# Patient Record
Sex: MALE | Race: WHITE | URBAN - METROPOLITAN AREA
[De-identification: names, ages, dates, MRNs, and addresses within clinical notes are randomized per-mention and may not be internally consistent; named-entity substitution may affect disease eponyms.]

---

## 2023-01-12 ENCOUNTER — OFFICE VISIT (OUTPATIENT)
Dept: ORTHOPEDIC SURGERY | Age: 28
End: 2023-01-12

## 2023-01-12 VITALS — WEIGHT: 180 LBS | BODY MASS INDEX: 22.38 KG/M2 | HEIGHT: 75 IN

## 2023-01-12 DIAGNOSIS — I51.7 LEFT VENTRICULAR DILATATION: Primary | ICD-10-CM

## 2023-01-12 DIAGNOSIS — S83.411A SPRAIN OF MEDIAL COLLATERAL LIGAMENT OF RIGHT KNEE, INITIAL ENCOUNTER: Primary | ICD-10-CM

## 2023-01-12 DIAGNOSIS — M25.561 RIGHT KNEE PAIN, UNSPECIFIED CHRONICITY: ICD-10-CM

## 2023-01-12 DIAGNOSIS — I34.1 MITRAL VALVE PROLAPSE: ICD-10-CM

## 2023-01-12 DIAGNOSIS — I77.810 AORTIC ROOT DILATATION (HCC): ICD-10-CM

## 2023-01-12 NOTE — PROGRESS NOTES
Name: Franki Rangel  YOB: 1995  Gender: male  MRN: [id number]      CC: Knee Injury (R Knee )       HPI: Franki Rangel is a 27 y.o. male who presents with Knee Injury (R Knee )  Pt reports he sustained an injury to his R MCL back in November. This is the first injury to this knee. Reports this happened while he was playing hockey. Reports he has been fine since then, no pain, and has been practicing full go. Denies any instability, locking, or clicking. Has not reported any episodes of swelling, however states his knee will feel stiff after sitting for extended periods of time. Reports he has been going to formal therapy for this injury, has not taken any over the counters or prescribed medication.     ROS/Meds/PSH/PMH/FH/SH: I personally reviewed the patients standard intake form.  Below are the pertinents    Tobacco:  reports that he has never smoked. He has never used smokeless tobacco.  Diabetes: none  Other: None    Physical Examination:  General: no acute distress  Lungs: breathing easily  CV: regular rhythm by pulse  Right Knee: Negative effusion present.  No tenderness to palpation over the medial joint line, some tenderness to palpation over the biceps femoris.  Full active and passive range of motion with no pain in the extremes.  Stable to  varus stress at 0 and 30 degrees no appreciable laxity.  He does have some mild grade 1 laxity to the valgus stress testing at 30 degrees but a solid endpoint and no pain associated with that.  No laxity in extension.  Negative Lachman's, anterior drawer.  Negative Karishma's, negative bounce home test.    Left knee exam is benign.      Imaging:   Knee XR: 3 views     Clinical Indication  1. Sprain of medial collateral ligament of right knee, initial encounter    2. Right knee pain, unspecified chronicity           Report: AP, lateral, sunrise views of the Right knee demonstrates no acute fracture dislocation, or advanced degenerative  changes    Impression: No acute findings as above           All imaging interpreted by myself Nathan Sanon MD independent of radiology review        Assessment:     ICD-10-CM    1. Sprain of medial collateral ligament of right knee, initial encounter  S83.411A       2. Right knee pain, unspecified chronicity  M25.561 XR KNEE RIGHT (3 VIEWS)          Plan: Think the patient is recovering well from a previous MCL sprain. He has no joint line pain and some very mild grade 1 laxity on valgus without pain. He does have some tightness that presents in the lateral knee and the biceps femoris I think may just be some tendinitis. Think is reasonable for him to work with the athletic training staff to manage his tightness in his hamstring. I think it is reasonable for him to return to live healthy and he can return to see me if he has return of pain with live action hockey. Nolan Leslie NP dictating as a scribe for MD Reyna Lopez.  Bess Sanon MD, [address] [place] [address] and Sports Medicine

## 2023-01-13 ENCOUNTER — INITIAL CONSULT (OUTPATIENT)
Dept: CARDIOLOGY CLINIC | Age: 28
End: 2023-01-13

## 2023-01-13 VITALS
DIASTOLIC BLOOD PRESSURE: 80 MMHG | WEIGHT: 181.5 LBS | HEIGHT: 75 IN | SYSTOLIC BLOOD PRESSURE: 122 MMHG | BODY MASS INDEX: 22.57 KG/M2 | HEART RATE: 61 BPM

## 2023-01-13 DIAGNOSIS — I34.1 MITRAL VALVE PROLAPSE: ICD-10-CM

## 2023-01-13 DIAGNOSIS — I05.9 MITRAL VALVE DISORDER: Primary | ICD-10-CM

## 2023-01-13 DIAGNOSIS — I34.0 NONRHEUMATIC MITRAL VALVE REGURGITATION: ICD-10-CM

## 2023-01-13 DIAGNOSIS — I77.810 DILATED AORTIC ROOT (HCC): ICD-10-CM

## 2023-01-13 ASSESSMENT — ENCOUNTER SYMPTOMS
PHOTOPHOBIA: 0
CONSTIPATION: 0
VOMITING: 0
COUGH: 0
SHORTNESS OF BREATH: 0
DIARRHEA: 0
NAUSEA: 0
WHEEZING: 0
ABDOMINAL DISTENTION: 0
ABDOMINAL PAIN: 0

## 2023-01-13 NOTE — PROGRESS NOTES
[facility] CARDIOLOGY  [address], [address]  PHONE: [phone]        NAME:  Marietta Ramirez  : 1995  MRN: [id number]     PCP:  Rob Martinez MD    SUBJECTIVE:   Marietta Ramirez is a 33 y.o. male seen for a consultation visit regarding the following:     Chief Complaint   Patient presents with    Consultation     Dilated aorta        HPI:    He presents to establish new cardiac care. 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 He needs clearance to play. He is clinically completely asymptomatic at rest and with exercise ain addition to playing hockey. He specifically denies any angina, syncope, presyncope, shortness of breath, dyspnea on exertion, or palpitations whatsoever. His echo indeed shows mitral valve prolapse but with only mild mitral regurgitation, trace AI with an aortic root that is minimally dilated at 4.1 cm. He does not have any significant left ventricular hypertrophy on echo, although his ECG is suggestive of borderline criteria. His exam is clinically completely asymptomatic and I hear no murmurs. Past Medical History, Past Surgical History, Family history, Social History, and Medications were all reviewed with the patient today and updated as necessary. No current outpatient medications on file. No current facility-administered medications for this visit. No Known Allergies    Patient Active Problem List    Diagnosis Date Noted    Mitral valve prolapse 2023     Priority: High    Nonrheumatic mitral valve regurgitation 2023     Priority: High    Dilated aortic root ([address] 2023     Priority: High         History reviewed. No pertinent surgical history. History reviewed. No pertinent family history.   No premature family CAD or SCD    Social History Tobacco Use    Smoking status: Never    Smokeless tobacco: Never   Substance Use Topics    Alcohol use: Social at most       ROS:    Review of Systems   Constitutional:  Negative for chills, fatigue, fever and unexpected weight change. HENT:  Negative for congestion, nosebleeds and tinnitus. Eyes:  Negative for photophobia and visual disturbance. Respiratory:  Negative for cough, shortness of breath and wheezing. Cardiovascular:  Negative for chest pain, palpitations and leg swelling. Gastrointestinal:  Negative for abdominal distention, abdominal pain, constipation, diarrhea, nausea and vomiting. Endocrine: Negative for cold intolerance and heat intolerance. Genitourinary:  Negative for dysuria, frequency and hematuria. Musculoskeletal:  Negative for arthralgias, joint swelling and myalgias. Skin:  Negative for rash and wound. Allergic/Immunologic: Negative for environmental allergies. Neurological:  Negative for dizziness, seizures, syncope and light-headedness. Hematological:  Negative for adenopathy. Does not bruise/bleed easily. Psychiatric/Behavioral:  Negative for agitation, behavioral problems, confusion and hallucinations. PHYSICAL EXAM:  Vitals:    01/13/23 1337   BP: 122/80   Pulse: 61   Weight: 181 lb 8 oz (82.3 kg)   Height: 6' 3\" (1.905 m)      Wt Readings from Last 3 Encounters:   01/13/23 181 lb 8 oz (82.3 kg)   01/13/23 180 lb (81.6 kg)   01/12/23 180 lb (81.6 kg)      BP Readings from Last 3 Encounters:   01/13/23 122/80   01/13/23 114/76        Physical Exam  Constitutional:       Appearance: Normal appearance. HENT:      Head: Normocephalic and atraumatic. Nose: Nose normal.      Mouth/Throat:      Mouth: Mucous membranes are moist.      Pharynx: Oropharynx is clear. Eyes:      Extraocular Movements: Extraocular movements intact. Pupils: Pupils are equal, round, and reactive to light. Neck:      Vascular: No carotid bruit or JVD. Cardiovascular:      Rate and Rhythm: Normal rate and regular rhythm. Heart sounds: No murmur heard. No friction rub. No gallop. Pulmonary:      Effort: Pulmonary effort is normal.      Breath sounds: Normal breath sounds. No wheezing or rhonchi. Abdominal:      General: Abdomen is flat. Bowel sounds are normal. There is no distension. Palpations: Abdomen is soft. Tenderness: There is no abdominal tenderness. Musculoskeletal:         General: Normal range of motion. Cervical back: Normal range of motion and neck supple. No tenderness. Skin:     General: Skin is warm and dry. Neurological:      General: No focal deficit present. Mental Status: He is alert and oriented to person, place, and time. Psychiatric:         Mood and Affect: Mood normal.         Behavior: Behavior normal.        Medical problems and test results were reviewed with the patient today. No results found for: CHOL  No results found for: TRIG  No results found for: HDL  No results found for: LDLCHOLESTEROL, LDLCALC  No results found for: LABVLDL, VLDL  No results found for: CHOLHDLRATIO     No results found for: NA, K, CL, CO2, BUN, CREATININE, GLUCOSE, CALCIUM      No results found for: WBC, HGB, HCT, MCV, PLT     No results found for: TSHFT4, TSH     No results found for: LABA1C    Results for orders placed or performed in visit on 01/13/23   EKG 12 Lead    Impression    Sinus  Rhythm  61bpm  Rosario = 116  -RSR(V1) -nondiagnostic.    Minimal voltage criteria for LVH  (R(V6) exceeds 2.26 mV)  -Voltage criteria w/o ST/T abnormality may be normal.   Results for orders placed or performed in visit on 01/13/23   Transthoracic echocardiogram (TTE) complete with contrast, bubble, strain, and 3D PRN   Result Value Ref Range    LV EDV A2C 122 mL    LV EDV A4C 158 mL    LV ESV A2C 60 mL    LV ESV A4C 66 mL    IVSd 0.9 0.6 - 1.0 cm    LVIDd 5.2 4.2 - 5.9 cm    LVIDs 3.8 cm    LVOT Peak Velocity 1.0 m/s    LVOT Peak Gradient 4 mmHg    LVPWd 1.0 0.6 - 1.0 cm    LV E' Lateral Velocity 15 cm/s    LV E' Septal Velocity 9 cm/s    LV Ejection Fraction A2C 51 %    LV Ejection Fraction A4C 58 %    EF BP 53 (A) 55 - 100 %    LA Minor Axis 5.2 cm    LA Major [place] 4.9 cm    LA Area 2C 22.6 cm2    LA Area 4C 17.6 cm2    LA Volume 2C 80 (A) 18 - 58 mL    LA Volume 4C 46 18 - 58 mL    LA Volume BP 62 (A) 18 - 58 mL    LA Diameter 3.8 cm    AV Peak Velocity 1.1 m/s    AV Peak Gradient 5 mmHg    Aortic Arch 2.5 cm    Aortic Root 4.1 cm    Ascending Aorta 2.5 cm    Descending Aorta 2.1 cm    MV E Wave Deceleration Time 183.0 ms    MV A Velocity 0.50 m/s    MV E Velocity 0.88 m/s    Est. RA Pressure 3 mmHg    RVIDd 4.3 cm    TR Max Velocity 2.24 m/s    TR Peak Gradient 20 mmHg    Body Surface Area 2.08 m2    Fractional Shortening 2D 27 28 - 44 %    LV ESV Index A4C 31 mL/m2    LV EDV Index A4C 75 mL/m2    LV ESV Index A2C 28 mL/m2    LV EDV Index A2C 58 mL/m2    LVIDd Index 2.46 cm/m2    LVIDs Index 1.80 cm/m2    LV RWT Ratio 0.38     LV Mass 2D 181.4 88 - 224 g    LV Mass 2D Index 86.0 49 - 115 g/m2    MV E/A 1.76     E/E' Ratio (Averaged) 7.82     E/E' Lateral 5.87     E/E' Septal 9.78     LA Volume Index BP 29 16 - 34 ml/m2    LA Volume Index 2C 38 (A) 16 - 34 mL/m2    LA Volume Index 4C 22 16 - 34 mL/m2    LA Size Index 1.80 cm/m2    LA/AO Root Ratio 0.93     Ao Root Index 1.94 cm/m2    Ascending Aorta Index 1.18 cm/m2    AV Velocity Ratio 0.91     RVSP 23 mmHg    Descending Aorta Index 1.00 cm/m2    Narrative      Left Ventricle: Normal left ventricular systolic function with a   visually estimated EF of 55 - 60%. Left ventricle size is normal. Normal   wall thickness. Normal wall motion. Normal diastolic function. Aortic Valve: Mild regurgitation. Mitral Valve: Mildly thickened leaflet, at the anterior and posterior   leaflets with mild bileaflet prolapse. Mild regurgitation. Tricuspid Valve: Mild regurgitation. Normal RVSP. The estimated RVSP is   23 mmHg. Left Atrium: Left atrium is mildly dilated. Aorta: Normal sized ascending aorta. Mildly dilated aortic root. Ao   root diameter is 4.1 cm. ASSESSMENT and PLAN    Katiuska Guerrero was seen today for consultation. Diagnoses and all orders for this visit:    Abnormal ECG-mild/minimal voltage criteria for LVH but echo confirms normal wall thickness and normal wall motion. Recheck echo in 1 year. -     EKG 12 Lead    Mitral valve prolapse-mild bileaflet thickening and prolapse but only mild associated mitral regurgitation, clinically asymptomatic. Recheck Brendan echo in 1 year    Nonrheumatic mitral and aortic valve regurgitation-as above    Dilated aortic root (HCC)-minimally dilated, clinically asymptomatic, may be normal for his body stature. Recheck echo in 1 year. He should be low risk to play hockey for the [employer]. He is to call immediately with any new onset exertional chest discomfort, dyspnea out of ordinary, or palpitations/dizziness. Return in about 1 year (around 1/13/2024).          Edd Latham MD  01/13/23  1:59 PM

## 2023-02-18 ENCOUNTER — HOSPITAL ENCOUNTER (OUTPATIENT)
Dept: MRI IMAGING | Age: 28
End: 2023-02-18
Payer: COMMERCIAL

## 2023-02-18 DIAGNOSIS — M25.561 RIGHT MEDIAL KNEE PAIN: Primary | ICD-10-CM

## 2023-02-18 DIAGNOSIS — M25.561 RIGHT MEDIAL KNEE PAIN: ICD-10-CM

## 2023-02-18 PROCEDURE — 73721 MRI JNT OF LWR EXTRE W/O DYE: CPT

## 2023-02-21 ENCOUNTER — OFFICE VISIT (OUTPATIENT)
Dept: ORTHOPEDIC SURGERY | Age: 28
End: 2023-02-21

## 2023-02-21 DIAGNOSIS — M25.561 RIGHT KNEE PAIN, UNSPECIFIED CHRONICITY: ICD-10-CM

## 2023-02-21 DIAGNOSIS — S89.91XA INJURY OF POSTERIOR CRUCIATE LIGAMENT OF RIGHT KNEE, INITIAL ENCOUNTER: Primary | ICD-10-CM

## 2023-02-21 NOTE — PROGRESS NOTES
Name: Truman Matthews  YOB: 1995  Gender: male  MRN: [id number]      CC: Knee Pain (Right)       HPI: Truman Matthews is an established patient of mine who presents with a new injury to the right knee. He reports colliding and bumping knees with an opposing player last Thursday. He complains of pain diffuse throughout the knee that worsens with flexion and prolonged weightbearing. He returns for follow up and MRI results. He did have an acute onset of swelling and an effusion the night of the game but that has resolved significantly over the last 3 to 4 days. .        Physical Examination:  General: no acute distress  Lungs: breathing easily  CV: regular rhythm by pulse  Right Knee: Trace effusion. Passive extension is symmetric to the contralateral side with some pain at the extremes. Flexion he lacks about 5 degrees compared to the contralateral side. He has negative posterior sag test.  He does have a grade 1 posterior drawer but an endpoint is appreciated. He is some mild pain associated with that. Negative Lachman's. Stable to varus and valgus stresses feels a little bit of discomfort to valgus at 30 degrees which is consistent with his previous MCL injury. Negative meniscal provocative testing. Imaging:   I reviewed the MRI scan of his right knee which demonstrates some bone edema on the medial femoral condyle just deep to the deep MCL and some signal in the MCL which is likely consistent with his previous MCL injury. There does appear to be a partial-thickness PCL tear near the femoral side in the mid substance but the fibers are oriented well ACL is intact menisci intact  All imaging interpreted by myself Nathan Terry MD independent of radiology review    Assessment:     ICD-10-CM    1. Injury of posterior cruciate ligament of right knee, initial encounter  S89. 91XA       2.  Right knee pain, unspecified chronicity  M25.561            Plan:   He has a recent MCL injury which he is recovering from. I think this is a grade 1 PCL injury. He has no significant instability on exam just some grade 1 laxity. His pain and effusion are resolving and improving already. We will plan to treat this nonoperatively with physical therapy and rehab with his . I do think he would benefit from a functional PCL brace which was ordered today. He can progress his activity as tolerated in the brace and I will see him back in about 3 weeks            Nathan Chun MD, [facility] and Sports Medicine

## 2023-02-21 NOTE — LETTER
DME Patient Authorization Form    Name: Henrietta Babinski  : 1995  MRN: [id number]   Age: 33 y.o. Gender: male  Delivery Address: [facility]     Diagnosis:     ICD-10-CM    1. Right knee pain, unspecified chronicity  M25.561       2. Injury of posterior cruciate ligament of right knee, initial encounter  S89. 91XA            Requested DME:  Knee Orthosis Prefab Double Upright Thigh & Calf-  ($1110) X 1 - right  FUNCTIONAL PCL BRACE  6\" above - 20\"  6\" below - 14\"    Clinical Notes:     **Indicates non-covered items by insurance. Payment expected on date of service. Electronically signed by  Provider: Edgar Orellana MD__Date: 2023                            [facility] Tax ID # [id number]        Durable Medical Equipment and/or Orthotics Patient Consent     I understand that my physician has prescribed this medical supply as part of my treatment plan as a matter of Medical Necessity.  I understand that I have a choice in where I receive my prescribed orthopedic supplies and/or services.  I authorize [facility] to furnish this service/product and to provide my insurance carrier with any information requested in order to process for payment.  I instruct my insurance carrier to pay [facility] directly for these services/products.  I understand that my insurance carrier may deny payment for this supply because it is a non-covered item, deemed not medically necessary or considered experimental.   I understand that any cost not covered by my insurance carrier will be solely my financial responsibility.  I have received the Supplier Standards and have reviewed them.    I have received the prescribed item and have been fully instructed on the proper use of the above services/products.    ______ (Patient Initials) I understand that all DME items are non-returnable after being dispensed. Items still in sealed packaging may be returned up to 14 days after purchasing. [facility] will replace items that are defective.    ______ (Patient Initials) I understand that [facility] will not file a claim with my insurance carrier for this service/product and I am waiving my right to file a claim on my own for this service/product with my insurance company as this item is NON-COVERED (Denoted by the **) by my Insurance company/policy.    ______ (Patient Initials) I understand that I am responsible to bring my equipment to the hospital for any surgery.      ______________________________________________  ________________________  Patient / Guarantor Signature          Date      Inventory Stickers            Thank you for considering [facility].  Your physician has prescribed specific medical equipment or devices for your home use.  The following describes your rights and responsibilities as our customer.  Right to Choose Providers:  You have a choice regarding which company supplies your home medical equipment and devices, and to consult your physician in this decision.  You may choose a medical supply store, a home medical equipment provider, or a specialist such as [facility].  [facility] will coordinate with your physician to provide the medical equipment or devices prescribed for your home use.    Right to Service:  You have the right to considerate, respectful and nondiscriminatory care.  You have the right to receive accurate and easily understood information about your health care.  If you speak a foreign language, or don't understand the discussions, assistance will be provided to allow you to make informed health care decisions.  You have the right to know your treatment options and to participate in decisions about your care, including the right to accept or refuse treatment.   You have the right to expect a reasonable response to your requests for treatment or service. You have the right to talk in confidence with health care providers and to have your health care information protected. You have the right to receive an explanation of your bill. You have the right to complain about the service or product you receive. Patient Responsibilities:  Please provide complete and accurate information about your health insurance benefits and make arrangements for the timely payment of your bill. [facility] will, if possible, assume responsibility for billing your insurance (Medicare, Medicaid and commercial) for the prescribed equipment or devices. If your policy does not cover the prescribed product, or only covers a portion of the bill, you are responsible for any remaining balance. Return and Exchange Policy:  [facility] will honor published  Warranties for products. [facility] will accept returns or exchanges within 14 days from the date of receipt, providin) the product must be in new condition; 2) receipt as required; and 3) used disposable and hygiene products may only be returned due to a defective product. Note: Refunds will be issued in a timely manner, please allow 4-6 weeks for processing. Complaint Procedures and DME Consumer Protection Resources:  [facility] values you as a customer, and is committed to resolving patient concerns. This commitment includes understanding and documenting your concerns, conducting a review of internal procedures, and providing you with an explanation and resolution to your concerns. Should you have any questions about our services or billing process, please contact our office at (practice phone number). If we are unable to resolve the concern, you have the right to direct comments to the office of Consumer Protection, in the [address] or the [facility] office, without fear of repercussion.     DMEPOS SUPPLIER STANDARDS    A supplier must be in compliance with all applicable Federal and [facility] and regulatory requirements. A supplier must provide complete and accurate information on the DMEPOS supplier application. Any changes to this information must be reported to the [facility] [employer] within 30 days. An authorized individual (one whose signature is binding) must sign the application for billing privileges. A supplier must fill orders from its own inventory, or must contract with other companies for the purchase of items necessary to fill the order. A supplier may not contract with any entity that is currently excluded from the Medicare program, any [facility] program, or from any other Federal procurement or Nonprocurement programs. A supplier must advise beneficiaries that they may rent or purchase inexpensive or routinely purchased durable medical equipment, and of the purchase option for capped rental equipment. A supplier must notify beneficiaries of warranty coverage and honor all warranties under applicable State Law, and repair or replace free of charge Medicare covered items that are under warranty. A supplier must maintain a physical facility on an appropriate site. A supplier must permit CMS, or its agents to conduct on-site inspections to ascertain the supplier's compliance with these standards. The supplier location must be accessible to beneficiaries during reasonable business hours, and must maintain a visible sign and posted hours of operation. A supplier must maintain a primary business telephone listed under the name of the business in a [employer] or a toll free number available through directory assistance. The exclusive use of a beeper, answering machine or cell phone is prohibited.   A supplier must have comprehensive liability insurance in the amount of at least $300,000 that covers both the supplier's place of business and all customers and employees of the supplier. If the supplier manufactures its own items, this insurance must also cover product liability and completed operations. A supplier must agree not to initiate telephone contact with beneficiaries, with a few exceptions allowed. This standard prohibits suppliers from calling beneficiaries in order to solicit new business. A supplier is responsible for delivery and must instruct beneficiaries on use of Medicare covered items, and maintain proof of delivery. A supplier must answer questions, and respond to complaints of the beneficiaries, and maintain documentation of such contacts. A supplier must maintain and replace at no charge or repair directly, or through a service contract with another company, Medicare covered items it has rented to beneficiaries. A supplier must accept returns of substandard (less than full quality for the particular item) or unsuitable items (inappropriate for the beneficiary at the time it was fitted and rented or sold) from beneficiaries. A supplier must disclose these supplier standards to each beneficiary to whom it supplies a Medicare-covered item. A supplier must disclose to the government any person having ownership, financial, or control interest in the supplier. A supplier must not convey or reassign a supplier number; i.e., the supplier may not sell or allow another entity to use its Medicare billing number. A supplier must have a complaint resolution protocol established to address beneficiary complaints that relate to these standards. A record of these complaints must be maintained at the physical facility. Complaint records must include: the name, address, telephone number and health insurance claim number of the beneficiary, a summary of the complaint, and any action taken to resolve it. A supplier must agree to furnish CMS any information required by the Medicare statute and implementing regulations.   A supplier of DMEPOS and other items and services must be accredited by a CMS-approved accreditation organization in order to receive and retain a supplier billing number. The accreditation must indicate the specific products and services, for which the supplier is accredited in order for the supplier to receive payment for those specific products and services. A DMEPOS supplier must notify their accreditation organization when a new DMEPOS location is opened. The accreditation organization may accredit the new supplier location for three months after it is operational without requiring a new site visit. All DMEPOS supplier locations, whether owned or subcontracted, must meet the [employer] and be separately accredited in order to bill Medicare. An accredited supplier may be denied enrollment or their enrollment may be revoked, if CMS determines that they are not in compliance with the DMEPOS quality standards. A DMEPOS supplier must disclose upon enrollment all products and services, including the addition of new product lines for which they are seeking accreditation. If a new product line is added after enrollment, the DMEPOS supplier will be responsible for notifying the accrediting body of the new product so that the DMEPOS supplier can be re-surveyed and accredited for these new products. Must meet the surety bond requirements specified in 42 C. F.R. 424.57(c). Implementation date- May 4, 2009. A supplier must obtain oxygen from a state-licensed oxygen supplier. A supplier must maintain ordering and referring documentation consistent with provisions found in 42 C. F.R. 424.516(f). DMEPOS suppliers are prohibited from sharing a practice location with certain other Medicare providers and suppliers. DMEPOS suppliers must remain open to the public for a minimum of 30 hours per week with certain exceptions.

## 2023-02-23 NOTE — PROGRESS NOTES
[facility] [address] [address]  Dept: [phone]      Physical Therapy Initial Assessment     Referring MD: Juan Jose Guthrei MD  Diagnosis:     ICD-10-CM    1. Right knee pain, unspecified chronicity  M25.561       2. Muscle weakness  M62.81       3. Tear of PCL (posterior cruciate ligament) of knee, right, sequela  S83.521S          Therapy precautions: limit HS strengthening and kneeling  Co-morbidities affecting plan of care: hx L ankle fx 1 yr ago and recent R MCL injury (12 weeks ago)  Total Time: 60 min, Timed Procedure Codes: 35 min   Visit count:  1    PERTINENT MEDICAL HISTORY     Past medical and surgical history:   No past medical history on file. No past surgical history on file. Medications: reviewed in chart   Allergies: No Known Allergies       SUBJECTIVE     Chief complaints/history of injury:    Date symptoms began: 2/15/23  Jorge Math of condition: Recent onset (initial onset within last 3 months)  Primary cause of current episode: Traumatic  How did symptoms start: Pt reports in November sustaining R MCL tear Gr II and he was out 11 weeks. He then returned to playing in January and re tweaked the [facility] w/ 2 weeks missed. He then returned to playing and on 2/15 and was hit knee on knee and sustained a R PCL tear Gr I.  He reports pain has improved in knee and he has been feeling better already. Of note, he has hx of L ankle ORIF and when being examined reports this injury never felt as though he fully recovered. He played in [facility] until he moved to [place] 1 month ago. 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 No    Pain Assessment:   Average Pain/symptom intensity (0-10 scale)  Last 24 hours: 0/10  Last week (1-7 days): 2-3/10  How often do you feel symptoms?  Occasionally (26-50%)  Description: sharp  Aggravating factors:cutting, skating, dynamic shifting  Alleviating factors: rest,    Neuro screen: denies numbness, tingling, and radiating pain        Patient Stated Goals: full return to hockey    OBJECTIVE EXAMINATION     Functional Outcome Questionnaire:   Lower Extremity Functional Scale: 76/80= 95% function     Observation:   Girth: 6\" prox quad 50 cm; apex of gastroc 35.5  Swelling/Edema: none to trace amount  Palpation: no TTP        TRIAL (RIGHT) Anterior (A)  Posteromedial (PM) Posterolateral (PL)   1 76 cm 135 cm 141 cm   2   80 cm 137 cm 140 cm   3   79 cm 140 cm 142 cm   Greatest Successful Reach        TRIAL (LEFT) Anterior (A)  Posteromedial (PM) Posterolateral (PL)   1 70 cm 135 cm 138 cm   2 62 cm 137 cm 140 cm   3 70 cm 140 cm 139 cm   Greatest Successful Reach                 A/PROM Measures:    Right Left Comment   Hip Flexion      Hip Abduction      Hip IR      Hip ER      Knee Extension -4 hyper -6    Knee Flexion 135 138 Very mild end ROM pain   Hip [facility] WFL    Ankle WFL Limited DF            Strength/MMT (0-5 Scale):   Right Left Comment   Knee Flexion NT NT    Knee Extension 69.8 63    Quad set      Hip Flexion      Hip Extension 91 87    Hip Abduction 49 49 Fatigues L and repeat measures weaker   Hip ADD 40 46.8    Hip IR      Ankle DF      Ankle PF              Pt generally weaker w/ L leg testing and functional assessment w/ Y balance. Special Tests/Function:     Squat: limited depth  Balance: impaired L vs R (SLS)      Treatment provided today consisted of initial evaluation followed by: Therapeutic exercise (52124) x 20 min to address ROM/strength deficits and to develop an initial HEP as noted below. SLS 3x30s airex w/ KB deltoid raise 10#  SL squat w/ KB crossover touch 3x5  Lunge iso  [place]  SL bridge LLE    Manual therapy (84741) x 15 min utilizing techniques to improve joint and/or soft tissue mobility, ROM, and function as well as helping to decrease pain/spasms and swelling.   Palpation and assessment of soft tissue, muscles, and landmarks   Quad medially and HS STM w/ pt prone      Patient Education on the condition/pathology, involved anatomy, and exercise rationale. CLINICAL DECISION MAKING/ASSESSMENT     Personal Factors/co-morbidities affecting POC (1-2 Medium/3+High): co-morbidities as previously noted   Problem List: (1-2 Low/ 3 Medium/ 4+ High) Pain  ROM limitations  Strength deficits  Impaired balance/proprioception  Decreased endurance/activity Tolerance  Limited work/occupational capacity  Restricted recreational participation    Clinical decision making: low complexity with therapist able to easily and confidently determine and predict expectations and future outcomes for the POC. Prognosis: excellent   Benefits and precautions of treatment explained to patient. Truman Matthews is a 33 y.o. male who presents to therapy today with stable clinical presentation (low complexity) related to R knee pain. Pt presents w/ dec strength, impaired mobility and overall dec functional ability. This limits adonis of running, skating, squatting   Pt would benefit from skilled physical therapy services to address the deficits noted above for return to prior level of function. PLAN OF CARE     Effective Dates: 2/24/2023 TO 4/7/2023  (42 days).     Frequency/Duration: 1x/week for 42 Day(s); pt to have f/u w/ [facility]  Interventions may include but are not limited to: (62785) Therapeutic exercise to develop ROM, strength, endurance and flexibility  (13205) Therapeutic activities using dynamic activities to improve function  (71677) Manual therapy techniques to improve joint and/or soft tissue mobility, ROM, and function as well as helping to decrease pain/spasms and swelling  (34608) Neuromuscular reeducation addressing impaired balance, coordination, kinesthetic sense, posture and proprioception  Home exercise program (HEP) development        GOALS     Short term goals to be met by 3/17/2023  (3 weeks):  Pt will demonstrate pain free ROM for donning shoes  Pt will demo 15 SL squat reps to bench to simulate strength/endurance for skating  Pt will be able to skate 30 min session with symptoms less than 3/10    Long term goals to be met by 4/7/2023  (6 weeks):   Pt will pass battery of hop testing for full sport participation  Pt will perform graded practice/contact/full participation in [address]

## 2023-02-24 ENCOUNTER — OFFICE VISIT (OUTPATIENT)
Age: 28
End: 2023-02-24

## 2023-02-24 DIAGNOSIS — S83.521S TEAR OF PCL (POSTERIOR CRUCIATE LIGAMENT) OF KNEE, RIGHT, SEQUELA: ICD-10-CM

## 2023-02-24 DIAGNOSIS — S89.91XA INJURY OF POSTERIOR CRUCIATE LIGAMENT OF RIGHT KNEE, INITIAL ENCOUNTER: ICD-10-CM

## 2023-02-24 DIAGNOSIS — M25.561 RIGHT KNEE PAIN, UNSPECIFIED CHRONICITY: Primary | ICD-10-CM

## 2023-02-24 DIAGNOSIS — M62.81 MUSCLE WEAKNESS: ICD-10-CM

## 2023-03-06 ENCOUNTER — OFFICE VISIT (OUTPATIENT)
Dept: ORTHOPEDIC SURGERY | Age: 28
End: 2023-03-06
Payer: COMMERCIAL

## 2023-03-06 ENCOUNTER — OFFICE VISIT (OUTPATIENT)
Dept: ORTHOPEDIC SURGERY | Age: 28
End: 2023-03-06

## 2023-03-06 DIAGNOSIS — S83.521S TEAR OF PCL (POSTERIOR CRUCIATE LIGAMENT) OF KNEE, RIGHT, SEQUELA: Primary | ICD-10-CM

## 2023-03-06 DIAGNOSIS — M25.561 RIGHT KNEE PAIN, UNSPECIFIED CHRONICITY: ICD-10-CM

## 2023-03-06 PROCEDURE — 99213 OFFICE O/P EST LOW 20 MIN: CPT | Performed by: ORTHOPAEDIC SURGERY

## 2023-03-06 NOTE — PROGRESS NOTES
Name: Dayna Diaz  YOB: 1995  Gender: male  MRN: [id number]      CC: Knee Pain (R Knee )       HPI: Dayna Diaz is a 33 y.o. male who returns for follow up on 3/6/23. He reports he is feeling well and that his knee feels better than when he was last seen. He is done noncontact skating and practice without any significant pain or swelling. Physical Examination:  General: no acute distress  Lungs: breathing easily  CV: regular rhythm by pulse  Right Knee: No effusion. Tibial station is just anterior to the femoral condyles is grade 1 posterior drawer but a solid endpoint. No pain with that. Full symmetric range of motion good quad strength tone and activation. Assessment:     ICD-10-CM    1. Tear of PCL (posterior cruciate ligament) of knee, right, sequela  S83.521S           Plan:   He is progressing very well. He got his PCL functional brace. Recommend that he wears that and see states and I think he can work back into the regular practice this week and progress to playing as his symptoms allow. We discussed the risk of reinjury is very low. I will see him back in the office as needed but his  will keep me posted on his progress. Tamica Price MD, [address] [place] [address] and Sports Medicine

## 2023-03-07 NOTE — PROGRESS NOTES
The patient was seen in the office with Margie Wise to check his brace out that he received directly from [facility]. 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 No orders or equipment will be going through [facility].

## 2024-10-28 ENCOUNTER — APPOINTMENT (OUTPATIENT)
Dept: CT IMAGING | Age: 29
End: 2024-10-28
Payer: COMMERCIAL

## 2024-10-28 ENCOUNTER — HOSPITAL ENCOUNTER (EMERGENCY)
Age: 29
Discharge: HOME | End: 2024-10-28
Attending: EMERGENCY MEDICINE
Payer: COMMERCIAL

## 2024-10-28 ENCOUNTER — APPOINTMENT (OUTPATIENT)
Dept: GENERAL RADIOLOGY | Age: 29
End: 2024-10-28
Payer: COMMERCIAL

## 2024-10-28 VITALS
TEMPERATURE: 98.1 F | DIASTOLIC BLOOD PRESSURE: 75 MMHG | WEIGHT: 190 LBS | BODY MASS INDEX: 23.62 KG/M2 | SYSTOLIC BLOOD PRESSURE: 131 MMHG | OXYGEN SATURATION: 98 % | RESPIRATION RATE: 18 BRPM | HEIGHT: 75 IN | HEART RATE: 59 BPM

## 2024-10-28 DIAGNOSIS — I77.810 AORTIC ROOT DILATION (HCC): ICD-10-CM

## 2024-10-28 DIAGNOSIS — Z86.79 HISTORY OF MITRAL VALVE PROLAPSE: ICD-10-CM

## 2024-10-28 DIAGNOSIS — R20.2 PARESTHESIA: Primary | ICD-10-CM

## 2024-10-28 DIAGNOSIS — R07.89 CHEST TIGHTNESS: ICD-10-CM

## 2024-10-28 LAB
ALBUMIN SERPL-MCNC: 4.5 G/DL (ref 3.5–5)
ALBUMIN/GLOB SERPL: 1.6 (ref 1–1.9)
ALP SERPL-CCNC: 39 U/L (ref 40–129)
ALT SERPL-CCNC: 17 U/L (ref 8–55)
ANION GAP SERPL CALC-SCNC: 10 MMOL/L (ref 9–18)
AST SERPL-CCNC: 27 U/L (ref 15–37)
BILIRUB SERPL-MCNC: 0.4 MG/DL (ref 0–1.2)
BILIRUB UR QL: NEGATIVE
BUN SERPL-MCNC: 15 MG/DL (ref 6–23)
CALCIUM SERPL-MCNC: 10.1 MG/DL (ref 8.8–10.2)
CHLORIDE SERPL-SCNC: 103 MMOL/L (ref 98–107)
CO2 SERPL-SCNC: 27 MMOL/L (ref 20–28)
CREAT SERPL-MCNC: 0.92 MG/DL (ref 0.8–1.3)
D DIMER PPP FEU-MCNC: <0.27 UG/ML(FEU)
ERYTHROCYTE [DISTWIDTH] IN BLOOD BY AUTOMATED COUNT: 11.9 % (ref 11.9–14.6)
GLOBULIN SER CALC-MCNC: 2.8 G/DL (ref 2.3–3.5)
GLUCOSE SERPL-MCNC: 95 MG/DL (ref 70–99)
GLUCOSE UR QL STRIP.AUTO: NEGATIVE MG/DL
HCT VFR BLD AUTO: 45 % (ref 41.1–50.3)
HGB BLD-MCNC: 15.1 G/DL (ref 13.6–17.2)
KETONES UR-MCNC: NEGATIVE MG/DL
LEUKOCYTE ESTERASE UR QL STRIP: NEGATIVE
MCH RBC QN AUTO: 30.6 PG (ref 26.1–32.9)
MCHC RBC AUTO-ENTMCNC: 33.6 G/DL (ref 31.4–35)
MCV RBC AUTO: 91.3 FL (ref 82–102)
NITRITE UR QL: NEGATIVE
NRBC # BLD: 0 K/UL (ref 0–0.2)
PH UR: 6.5 (ref 5–9)
PLATELET # BLD AUTO: 240 K/UL (ref 150–450)
PMV BLD AUTO: 10.4 FL (ref 9.4–12.3)
POTASSIUM SERPL-SCNC: 4.2 MMOL/L (ref 3.5–5.1)
PROT SERPL-MCNC: 7.3 G/DL (ref 6.3–8.2)
PROT UR QL: NEGATIVE MG/DL
RBC # BLD AUTO: 4.93 M/UL (ref 4.23–5.6)
RBC # UR STRIP: ABNORMAL
SERVICE CMNT-IMP: ABNORMAL
SODIUM SERPL-SCNC: 141 MMOL/L (ref 136–145)
SP GR UR: 1.01 (ref 1–1.02)
TROPONIN T SERPL HS-MCNC: 10 NG/L (ref 0–22)
TROPONIN T SERPL HS-MCNC: 9 NG/L (ref 0–22)
UROBILINOGEN UR QL: 0.2 EU/DL (ref 0.2–1)
WBC # BLD AUTO: 8.1 K/UL (ref 4.3–11.1)

## 2024-10-28 PROCEDURE — 72125 CT NECK SPINE W/O DYE: CPT

## 2024-10-28 PROCEDURE — 71045 X-RAY EXAM CHEST 1 VIEW: CPT

## 2024-10-28 PROCEDURE — 6360000002 HC RX W HCPCS

## 2024-10-28 PROCEDURE — 36415 COLL VENOUS BLD VENIPUNCTURE: CPT

## 2024-10-28 PROCEDURE — 84484 ASSAY OF TROPONIN QUANT: CPT

## 2024-10-28 PROCEDURE — 85379 FIBRIN DEGRADATION QUANT: CPT

## 2024-10-28 PROCEDURE — 80053 COMPREHEN METABOLIC PANEL: CPT

## 2024-10-28 PROCEDURE — 93005 ELECTROCARDIOGRAM TRACING: CPT | Performed by: EMERGENCY MEDICINE

## 2024-10-28 PROCEDURE — 81003 URINALYSIS AUTO W/O SCOPE: CPT

## 2024-10-28 PROCEDURE — 85027 COMPLETE CBC AUTOMATED: CPT

## 2024-10-28 PROCEDURE — 99285 EMERGENCY DEPT VISIT HI MDM: CPT

## 2024-10-28 PROCEDURE — 70450 CT HEAD/BRAIN W/O DYE: CPT

## 2024-10-28 RX ORDER — PREDNISONE 20 MG/1
40 TABLET ORAL DAILY
Qty: 10 TABLET | Refills: 0 | Status: SHIPPED | OUTPATIENT
Start: 2024-10-28 | End: 2024-11-02

## 2024-10-28 ASSESSMENT — ENCOUNTER SYMPTOMS
BACK PAIN: 0
CHEST TIGHTNESS: 1
ABDOMINAL PAIN: 0
COUGH: 0
VOMITING: 0
COLOR CHANGE: 0
SHORTNESS OF BREATH: 1
NAUSEA: 0
DIARRHEA: 0
RHINORRHEA: 0

## 2024-10-28 ASSESSMENT — PAIN DESCRIPTION - LOCATION: LOCATION: CHEST

## 2024-10-28 ASSESSMENT — PAIN SCALES - GENERAL
PAINLEVEL_OUTOF10: 1
PAINLEVEL_OUTOF10: 4

## 2024-10-28 ASSESSMENT — LIFESTYLE VARIABLES
HOW OFTEN DO YOU HAVE A DRINK CONTAINING ALCOHOL: NEVER
HOW MANY STANDARD DRINKS CONTAINING ALCOHOL DO YOU HAVE ON A TYPICAL DAY: PATIENT DOES NOT DRINK

## 2024-10-28 NOTE — DISCHARGE INSTRUCTIONS
Medications as prescribed.  Follow-up with your team doctor for clearance prior to resuming practice.  Follow-up with [facility] cardiology when called with appointment time.  Call them in 48 hours if you have not heard from them in that timeframe.  Return if any new, worsening or concerning symptoms

## 2024-10-28 NOTE — ED NOTES
Patient mobility status  with no difficulty. Provider aware     I have reviewed discharge instructions with the patient.  The patient verbalized understanding.    Patient left ED via Discharge Method: ambulatory to Home with  friend .    Opportunity for questions and clarification provided.     Patient given 1 scripts.           Genet Ruiz RN  10/28/24 [id number]

## 2024-10-28 NOTE — ED TRIAGE NOTES
Pt ambulatory unassisted to triage. Pt complains of dizziness, numbness to the back of his head and chest tightness x2 days. Reports playing hockey this weekend when symptoms started. Denies hitting head. Denies LOC.

## 2024-10-28 NOTE — ED PROVIDER NOTES
Emergency Department Provider Note       PCP: Maria Elena Valera APRN - NP   Age: 29 y.o.   Sex: male     DISPOSITION Decision To Discharge 10/28/2024 06:27:06 PM            ICD-10-CM    1. Paresthesia  R20.2       2. Chest tightness  R07.89 [facility]      3. History of mitral valve prolapse  Z86.79 [facility]      4. Aortic root dilation (HCC)  I77.810 [facility]          Medical Decision Making     Patient presented with scalp paresthesias and intermittent chest tightness and what sounds like maybe a couple of panic attacks over the last few days.  Panic attacks would not cause persistent paresthesias in the scalp.  He had some recent headaches as well.  CT scan imaging of the head and neck were reassuring and normal.  EKG showed LVH and he has a history of mitral valve prolapse and dilation of the aortic root.  He was cleared by [facility] cardiology in January 2023 with recommendations to repeat an echo in 1 year, but he was [place] of the [place] at that time.  Will refer back for repeat echocardiac allergy and clearance.  Patient has a team doctor with whom he will follow-up with as well.  We will try burst of steroids to see if this calms any inflammation down in the neck and scalp that could be irritating peripheral nerves.  His NIH score is 0.  No sign of stroke.  Troponin x 2 and D-dimer normal  ED Course as of 10/28/24 1832   Mon Oct 28, 2024   1826 Workup is reassuring.  Will refer back to cardiology given history of mitral valve prolapse and aortic root dilation that was very mild.  He will follow-up with the team physician as well.  I suspect his paresthesias in both sides of the scalp are likely to peripheral nerve irritation so we will try a burst of steroids to see if we can calm down inflammation and improve the symptoms.  Perhaps some of it may be anxiety related as he does seem to describe panic attacks on a

## 2024-10-29 LAB
EKG ATRIAL RATE: 57 BPM
EKG DIAGNOSIS: NORMAL
EKG P AXIS: 60 DEGREES
EKG P-R INTERVAL: 110 MS
EKG Q-T INTERVAL: 396 MS
EKG QRS DURATION: 92 MS
EKG QTC CALCULATION (BAZETT): 385 MS
EKG R AXIS: 88 DEGREES
EKG T AXIS: 58 DEGREES
EKG VENTRICULAR RATE: 57 BPM

## 2024-10-29 PROCEDURE — 93010 ELECTROCARDIOGRAM REPORT: CPT | Performed by: INTERNAL MEDICINE

## 2024-10-31 ENCOUNTER — INITIAL CONSULT (OUTPATIENT)
Age: 29
End: 2024-10-31

## 2024-10-31 VITALS
HEIGHT: 75 IN | HEART RATE: 72 BPM | SYSTOLIC BLOOD PRESSURE: 118 MMHG | BODY MASS INDEX: 24.25 KG/M2 | DIASTOLIC BLOOD PRESSURE: 88 MMHG | WEIGHT: 195 LBS

## 2024-10-31 DIAGNOSIS — R00.2 PALPITATIONS: Primary | ICD-10-CM

## 2024-10-31 DIAGNOSIS — I34.1 MITRAL VALVE PROLAPSE: ICD-10-CM

## 2024-10-31 DIAGNOSIS — I71.21 ANEURYSM OF ASCENDING AORTA WITHOUT RUPTURE (HCC): ICD-10-CM

## 2024-10-31 NOTE — PROGRESS NOTES
[facility] CARDIOLOGY OUTPATIENT CONSULTATION    Date: 10/31/2024  Patient's Primary Care Physician:  Maria Elena Valera, EVELYN - NP  Referring Physician:  Frandy Bhatti MD     Subjective     Reason for Visit: Palpitations    History of Present Illness   Mr. Rangel is a 29 y.o. male with past medical history of mitral valve prolapse, aortic root dilation, who presents to [facility] following an emergency department visit for dyspnea associated with panic attack. He reports episodes of paresthesias in his scalp occurring when he was at rest, and began to panic out of concern that this might reflect the presence of a brain tumor or stroke. He developed dyspnea and palpitations which lasted for 20-30 minutes. He denies associated chest discomfort. He had 4-5 such episodes before presenting to the ED. He was reassured by his ED testing, which included benign brain imaging, and has been asymptomatic since.    No family history of heart disease.    Review of Systems   Cardiovascular review of systems negative accept as above.    Home Medications  Prior to Admission medications    Medication Sig Start Date End Date Taking? Authorizing Provider   predniSONE (DELTASONE) 20 MG tablet Take 2 tablets by mouth daily for 5 days 10/28/24 11/2/24  Frandy Bhatti MD       Objective     Physical Exam  There were no vitals filed for this visit.  BP Readings from Last 3 Encounters:   10/28/24 131/75   01/13/23 122/80   01/13/23 114/76     Wt Readings from Last 3 Encounters:   10/28/24 86.2 kg (190 lb)   01/13/23 82.3 kg (181 lb 8 oz)   01/13/23 81.6 kg (180 lb)     Estimated body mass index is 23.75 kg/m² as calculated from the following:    Height as of 10/28/24: 1.905 m (6' 3\").    Weight as of 10/28/24: 86.2 kg (190 lb).    CONSTITUTIONAL: Well nourished, pleasant and comfortable, no acute distress  HEENT: Mucous membranes moist, unremarkable without jaundice or pallor  NECK: Supple, jugular venous pressure less than 10 cm

## 2024-11-13 ENCOUNTER — HOSPITAL ENCOUNTER (OUTPATIENT)
Dept: GENERAL RADIOLOGY | Age: 29
Discharge: HOME | End: 2024-11-16
Payer: COMMERCIAL

## 2024-11-13 DIAGNOSIS — S97.82XA CRUSHING INJURY OF LEFT FOOT, INITIAL ENCOUNTER: ICD-10-CM

## 2024-11-13 PROCEDURE — 73630 X-RAY EXAM OF FOOT: CPT

## 2024-11-19 ENCOUNTER — TELEPHONE (OUTPATIENT)
Age: 29
End: 2024-11-19

## 2024-11-19 NOTE — TELEPHONE ENCOUNTER
----- Message from Dr. Mika Lo MD sent at 11/18/2024  5:13 PM EST -----  Please let the patient know that he did not have any significant arrhythmias, and that when he pressed the button to indicate symptoms, the rhythm was normal.     Called unable to leave message mailbox was full.

## 2024-11-21 ENCOUNTER — TELEPHONE (OUTPATIENT)
Age: 29
End: 2024-11-21

## 2024-11-21 NOTE — TELEPHONE ENCOUNTER
Patient called stating he was returning an earlier call from MM in re to arrhythmias.    Please call and advise.

## 2025-01-28 DIAGNOSIS — M25.561 ACUTE PAIN OF RIGHT KNEE: Primary | ICD-10-CM

## 2025-02-04 ENCOUNTER — EVALUATION (OUTPATIENT)
Age: 30
End: 2025-02-04

## 2025-02-04 DIAGNOSIS — M25.561 ACUTE PAIN OF RIGHT KNEE: Primary | ICD-10-CM

## 2025-02-04 DIAGNOSIS — M62.81 MUSCLE WEAKNESS: ICD-10-CM

## 2025-02-04 PROCEDURE — 97161 PT EVAL LOW COMPLEX 20 MIN: CPT | Performed by: PHYSICAL THERAPIST

## 2025-02-04 PROCEDURE — 97110 THERAPEUTIC EXERCISES: CPT | Performed by: PHYSICAL THERAPIST

## 2025-02-04 NOTE — PROGRESS NOTES
[facility]  [address] [place] [id number]  Dept: [phone]      Physical Therapy Initial Assessment     Referring MD: Jason Snyder MD  Diagnosis:     ICD-10-CM    1. Acute pain of right knee  M25.561       2. Muscle weakness  M62.81          Payor: Payor: / No coverage found. Billing pattern: Commercial- substantial/midpoint time each CPT    Timed Procedure Codes min: 30, Total Time: 45  min  Modifier needed: No  Visit count: 1     PERTINENT MEDICAL HISTORY     Past medical and surgical history:   No past medical history on file.   No past surgical history on file.  Medications: reviewed in chart   Allergies: No Known Allergies       SUBJECTIVE     Chief complaints/history of injury:    Date symptoms began: 1/25/25  Nature of condition: Recent onset (initial onset within last 3 months)  Primary cause of current episode: Traumatic  How did symptoms start: Pt reports on 1/25/25 he was skating backwards and switching to move forward when his skate caught creating a varus moment or his R knee.  He has since rested and is on paternity leave for the birth of his first child.   He states knee feels much better and he has had no pain c/o.  Of note he had PCL injury 2 years ago on R knee; MCL 3x all managed w/ conservative management.        Received previous outpatient therapy? No    Pain Assessment:   Average Pain/symptom intensity (0-10 scale)  Last 24 hours: 0/10; reports feeling like he needs to guard/protect his knee w/ certain positions  Last week (1-7 days): 0/10  How often do you feel symptoms? Occasionally (26-50%)  Description:  guarding  Aggravating factors: push off/cutting, twisting  Alleviating factors: none    Neuro screen: denies numbness, tingling, and radiating pain      Patient Stated Goals: full return to sport    Medications: reviewed in chart      OBJECTIVE EXAMINATION     Functional Outcome Questionnaire:   Lower Extremity Functional Scale: 80/80= 100%

## 2025-02-06 ENCOUNTER — TREATMENT (OUTPATIENT)
Age: 30
End: 2025-02-06

## 2025-02-06 DIAGNOSIS — M25.561 ACUTE PAIN OF RIGHT KNEE: Primary | ICD-10-CM

## 2025-02-06 DIAGNOSIS — M62.81 MUSCLE WEAKNESS: ICD-10-CM

## 2025-02-06 NOTE — PROGRESS NOTES
[facility]  [address] [facility] [id number]  Dept: [phone]      Physical Therapy Initial Assessment     Referring MD: Jason Snyder MD  Diagnosis:     ICD-10-CM    1. Acute pain of right knee  M25.561       2. Muscle weakness  M62.81          Payor: Payor: / No coverage found. Billing pattern: Commercial- substantial/midpoint time each CPT    Timed Procedure Codes min: 45, Total Time: 50  min  Modifier needed: No  Visit count: 2     Chief complaints/history of injury:    Date symptoms began: 1/25/25  Nature of condition: Recent onset (initial onset within last 3 months)  Primary cause of current episode: Traumatic  How did symptoms start: Pt reports on 1/25/25 he was skating backwards and switching to move forward when his skate caught creating a varus moment or his R knee.  He has since rested and is on paternity leave for the birth of his first child.   He states knee feels much better and he has had no pain c/o.  Of note he had PCL injury 2 years ago on R knee; MCL 3x all managed w/ conservative management.      Patient Stated Goals: full return to sport      SUBJECTIVE     Pt reports his knee has been feeling better and he was able to work out and skate w/ no pain.       Medications: no changes since last session      OBJECTIVE     Functional Outcome Questionnaire:   Lower Extremity Functional Scale: 80/80= 100% function       A/PROM Measures:    Right Left Comment   Knee Extension 0 0    Knee Flexion 140 140 Pain at end ROM   Hip WFL WFL IR impairments aneesh   Ankle WFL WFL            Strength/MMT (0-5 Scale):   Right Left Comment   Knee Flexion 5 5    Knee Extension 5 5          Treatment provided today consisted of:    Therapeutic exercise (80777) x 45 min to address ROM/strength deficits.    Current Exercises Past Exercises (not performed today)   Bike 8' Lv 4  SL squat cone /dwn [id number] 3x4 cones  Lunge slider 3 way 15# db 3x6  SLS w/ lateral and anterior

## 2025-02-23 DIAGNOSIS — S02.5XXB OPEN FRACTURE OF TOOTH, INITIAL ENCOUNTER: ICD-10-CM

## 2025-02-23 DIAGNOSIS — S01.511A LIP LACERATION, INITIAL ENCOUNTER: Primary | ICD-10-CM

## 2025-02-23 RX ORDER — AMOXICILLIN 875 MG/1
875 TABLET, COATED ORAL 2 TIMES DAILY
Qty: 20 TABLET | Refills: 0 | Status: SHIPPED | OUTPATIENT
Start: 2025-02-23 | End: 2025-03-05

## 2025-04-04 DIAGNOSIS — M79.672 ACUTE FOOT PAIN, LEFT: Primary | ICD-10-CM

## 2025-04-07 ENCOUNTER — RESULTS FOLLOW-UP (OUTPATIENT)
Dept: FAMILY MEDICINE CLINIC | Facility: CLINIC | Age: 30
End: 2025-04-07

## 2025-04-07 ENCOUNTER — HOSPITAL ENCOUNTER (OUTPATIENT)
Dept: GENERAL RADIOLOGY | Age: 30
Discharge: HOME | End: 2025-04-09

## 2025-04-07 DIAGNOSIS — M79.672 ACUTE FOOT PAIN, LEFT: ICD-10-CM

## 2025-04-07 PROCEDURE — 73630 X-RAY EXAM OF FOOT: CPT

## 2025-07-03 ENCOUNTER — TELEPHONE (OUTPATIENT)
Dept: FAMILY MEDICINE CLINIC | Facility: CLINIC | Age: 30
End: 2025-07-03

## 2025-07-03 NOTE — TELEPHONE ENCOUNTER
[facility] called wanting to know if we had received the records they had sent to us. Please advise if not at [phone].